# Patient Record
(demographics unavailable — no encounter records)

---

## 2025-01-15 NOTE — HISTORY OF PRESENT ILLNESS
[Currently In Menopause] : currently in menopause [Yes] : Patient has concerns regarding sex [Currently Active] : currently active [Men] : men [TextBox_4] : Elizabeth is a  who presents today for an annual exam.  she has c/o vaginal dryness, pain with sex and urinary frequency.  She also has c/o intermittent vaginal itching.  She states she is overdue for colonoscopy - was advised 3 year f/u and also states PCP advised her to stop baby aspirin 2nd to anemia. PT strongly advised to f/u for repeat colonoscopy to r/o GI blood loss  We also reviewed the importance of routine weight bearing/weight lifting exercise in general and especially with Ozempic usage [Mammogramdate] : 2024 [PapSmeardate] : 10 years ago [ColonoscopyDate] : 5 years ago [FreeTextEntry1] : PAIN

## 2025-01-15 NOTE — DISCUSSION/SUMMARY
[FreeTextEntry1] : 1) pap performed 2) risks vs. benefits of vaginal estrogen reviewed. rx issued and instructions for usage reviewed 3) rx for mammoggram and DEXA issued 4) routine weight bearing/weight lifting exercise advised 5) f/u for repeat colonoscopy Return to office in one year, sooner prn